# Patient Record
Sex: FEMALE | URBAN - METROPOLITAN AREA
[De-identification: names, ages, dates, MRNs, and addresses within clinical notes are randomized per-mention and may not be internally consistent; named-entity substitution may affect disease eponyms.]

---

## 2017-06-09 ENCOUNTER — DOCUMENTATION ONLY (OUTPATIENT)
Dept: INTERNAL MEDICINE CLINIC | Age: 77
End: 2017-06-09

## 2017-06-09 NOTE — LETTER
6/15/2017 11:48 AM 
 
Ms. Fer Cowart [address] [facility] [address] [id number] Dear Gareth Meléndez: 
 
I hope this letter finds you well. I am a Licensed Practical Nurse with [facility] and I have attempted to contact you by phone, but was unsuccessful. Your good health is important to us. As always, our goal is to be your partner in life-long wellness. Please contact our office at your earliest convenience. If you have any questions, please do not hesitate to give us a call at the number listed above. Sincerely, Diana Lux LPN

## 2017-06-09 NOTE — PROGRESS NOTES
Spoke with pt's hospice RN Susan Garcia. Due to lack of weight loss based on arm measurement and normal albumin, she is being discharged from hospice with last date of service expected to be Monday. Her family had been planning on placement for respite later this month due to travel plans. Please see if there are SNFs in the area that may have beds available for this and what paperwork is needed from our end to facilitate. Travel plans are June 22 through July 8. Pt has h/o stroke, is nonambulatory, incontinent of bowel and bladder.

## 2017-06-12 NOTE — PROGRESS NOTES
Please let pt family know that all they need to do is look around at nursing homes or assisted living facilities in the area once they choose one if the facility requires any info from us have them let us know.

## 2017-06-12 NOTE — PROGRESS NOTES
Attempted to reach patient regarding respite care. No answer; left message for pt to return call to the office at [phone].  Will continue to try to contact patient

## 2017-06-13 NOTE — PROGRESS NOTES
Attempted to contact pt at  number, no answer. Lvm for pt to return call to office at [phone]. Will continue to try to contact pt.

## 2017-06-14 DIAGNOSIS — R53.81 DEBILITY: ICD-10-CM

## 2017-06-14 DIAGNOSIS — I69.30 H/O: STROKE WITH RESIDUAL EFFECTS: Primary | ICD-10-CM

## 2017-06-14 NOTE — PROGRESS NOTES
Attempted to contact pt at  number, no answer. Lvm for pt to return call to office at [phone]. Will continue to try to contact pt.

## 2017-06-15 ENCOUNTER — TELEPHONE (OUTPATIENT)
Dept: INTERNAL MEDICINE CLINIC | Age: 77
End: 2017-06-15

## 2017-06-15 NOTE — PROGRESS NOTES
Spoke with pt's daughter. Pt is being discharged from hospice due to lack of weight loss. Orders placed for hospital bed, wheelchair, home health.

## 2017-06-15 NOTE — PROGRESS NOTES
Attempted to contact pt at  number, no answer. Lvm for pt to return call to office at [phone]. I have been unable to reach this patient by phone. A letter is being sent to the last known home address. Encounter will be closed.

## 2017-06-15 NOTE — TELEPHONE ENCOUNTER
Incoming call from [place] with [employer]. 2 pt identifiers confirmed. States they received referral for pt however, they will not be able to take pt because they are not accepting any new pt. States pt has never been a home health pt of theirs, perhaps pt was with Hospice and [address] [place] [address]. Dr Nakul Taylor please be advised.

## 2017-06-16 ENCOUNTER — HOME HEALTH ADMISSION (OUTPATIENT)
Dept: HOME HEALTH SERVICES | Facility: HOME HEALTH | Age: 77
End: 2017-06-16
Payer: MEDICARE

## 2017-06-16 ENCOUNTER — DOCUMENTATION ONLY (OUTPATIENT)
Dept: INTERNAL MEDICINE CLINIC | Age: 77
End: 2017-06-16

## 2017-06-16 NOTE — TELEPHONE ENCOUNTER
I have faxed over the order for the hospital bed to [facility] with insurance card, demographic and order. Any question on the papers I faxed they are on my keyboard upstairs.

## 2017-06-16 NOTE — TELEPHONE ENCOUNTER
Spoke with Sylvain Quezada she stated that no one at this time had called them to let them know about the referral.  She stated that she will be working on it and that they will try to get out to see patient tomorrow 6/17/17 or Monday at the latest.  Advised that if [facility] took her bed then can we fax over an order to them to get a bed out to patient she stated yes.

## 2017-06-16 NOTE — TELEPHONE ENCOUNTER
These were ordered two days ago but please notify her that her organization has informed me they are not taking new patients. LPN is working on arranging home health with [employer].

## 2017-06-16 NOTE — TELEPHONE ENCOUNTER
She was on hospice, discharged today. Please request [facility] to see patient.

## 2017-06-16 NOTE — TELEPHONE ENCOUNTER
Incoming call from pt  Melanie with [facility] [address] [place] [address]. Zaria Oropeza states that pt is being discharged today 06/16/2017. She states that MD will need to put in a new order for hospital bed and any other supplies pt may need. States to please contact her when supplies have been ordered so that they can  their hospital bed once a delivery date is set. Please be advised.

## 2017-06-16 NOTE — TELEPHONE ENCOUNTER
Spoke with Bela marley with two identifier at [facility] she stated that we need to order the hospital bed and send it to [facility] for delivery. Advised I will get that going. She stated that they are keeping and eye on this chart and once the face to face is done they will finish processing the paperwork to go out and evaluate the patient. I will be faxing over the order to [facility] today for the hospital bed. Provider is aware of this.

## 2017-06-16 NOTE — TELEPHONE ENCOUNTER
[facility] stated they could not begin home care until patient sees PCP.  Home care policy states patient must see pcp within 90 days of assumption of home care.

## 2017-06-16 NOTE — TELEPHONE ENCOUNTER
Called and spoke with Parish Nguyen verified with two Identifiers asked if they had picked up patient's hospice bed that we where told she was discharged from care on Monday. Parish Nguyen stated at this time patient was still active but she needed to call her  and find out and she will call me back.

## 2017-06-19 ENCOUNTER — TELEPHONE (OUTPATIENT)
Dept: INTERNAL MEDICINE CLINIC | Age: 77
End: 2017-06-19

## 2017-06-19 ENCOUNTER — HOME CARE VISIT (OUTPATIENT)
Dept: SCHEDULING | Facility: HOME HEALTH | Age: 77
End: 2017-06-19
Payer: MEDICARE

## 2017-06-19 VITALS
OXYGEN SATURATION: 96 % | HEART RATE: 86 BPM | TEMPERATURE: 98.3 F | DIASTOLIC BLOOD PRESSURE: 90 MMHG | RESPIRATION RATE: 16 BRPM | SYSTOLIC BLOOD PRESSURE: 152 MMHG

## 2017-06-19 PROCEDURE — 400013 HH SOC

## 2017-06-19 PROCEDURE — G0299 HHS/HOSPICE OF RN EA 15 MIN: HCPCS

## 2017-06-20 ENCOUNTER — HOME CARE VISIT (OUTPATIENT)
Dept: HOME HEALTH SERVICES | Facility: HOME HEALTH | Age: 77
End: 2017-06-20
Payer: MEDICARE

## 2017-06-22 ENCOUNTER — HOME CARE VISIT (OUTPATIENT)
Dept: SCHEDULING | Facility: HOME HEALTH | Age: 77
End: 2017-06-22
Payer: MEDICARE

## 2017-06-22 ENCOUNTER — TELEPHONE (OUTPATIENT)
Dept: INTERNAL MEDICINE CLINIC | Age: 77
End: 2017-06-22

## 2017-06-22 VITALS
HEART RATE: 72 BPM | TEMPERATURE: 97.5 F | OXYGEN SATURATION: 97 % | RESPIRATION RATE: 18 BRPM | DIASTOLIC BLOOD PRESSURE: 100 MMHG | SYSTOLIC BLOOD PRESSURE: 160 MMHG

## 2017-06-22 PROCEDURE — G0496 LPN CARE TRAIN/EDU IN HH: HCPCS

## 2017-06-22 NOTE — TELEPHONE ENCOUNTER
[facility] just called to let you they received the order the hospital bed but betty to the patient's Ins they are unable to process it. They have forwarded the order to [facility].

## 2017-06-23 NOTE — TELEPHONE ENCOUNTER
[address] Marty Pratik spoke with Haley. Two patient Identifiers confirmed. Advised I was following up on referral. Haley advised that it was processing but there was additional paperwork needed and she would be faxing over order and request to [phone]. Will await paperwork for signature.

## 2017-06-27 ENCOUNTER — HOME CARE VISIT (OUTPATIENT)
Dept: SCHEDULING | Facility: HOME HEALTH | Age: 77
End: 2017-06-27
Payer: MEDICARE

## 2017-06-27 VITALS
HEART RATE: 80 BPM | SYSTOLIC BLOOD PRESSURE: 138 MMHG | OXYGEN SATURATION: 98 % | DIASTOLIC BLOOD PRESSURE: 70 MMHG | RESPIRATION RATE: 16 BRPM | TEMPERATURE: 97.5 F

## 2017-06-27 PROCEDURE — G0496 LPN CARE TRAIN/EDU IN HH: HCPCS

## 2017-06-29 ENCOUNTER — HOME CARE VISIT (OUTPATIENT)
Dept: SCHEDULING | Facility: HOME HEALTH | Age: 77
End: 2017-06-29
Payer: MEDICARE

## 2017-06-29 VITALS
TEMPERATURE: 98 F | HEART RATE: 76 BPM | DIASTOLIC BLOOD PRESSURE: 70 MMHG | OXYGEN SATURATION: 96 % | RESPIRATION RATE: 16 BRPM | SYSTOLIC BLOOD PRESSURE: 180 MMHG

## 2017-06-29 PROCEDURE — G0496 LPN CARE TRAIN/EDU IN HH: HCPCS

## 2017-07-05 ENCOUNTER — HOME CARE VISIT (OUTPATIENT)
Dept: HOME HEALTH SERVICES | Facility: HOME HEALTH | Age: 77
End: 2017-07-05
Payer: MEDICARE

## 2017-07-05 ENCOUNTER — HOME CARE VISIT (OUTPATIENT)
Dept: SCHEDULING | Facility: HOME HEALTH | Age: 77
End: 2017-07-05
Payer: MEDICARE

## 2017-07-05 VITALS
DIASTOLIC BLOOD PRESSURE: 90 MMHG | TEMPERATURE: 97.1 F | OXYGEN SATURATION: 98 % | SYSTOLIC BLOOD PRESSURE: 184 MMHG | HEART RATE: 83 BPM | RESPIRATION RATE: 16 BRPM

## 2017-07-05 PROCEDURE — G0299 HHS/HOSPICE OF RN EA 15 MIN: HCPCS

## 2017-07-07 ENCOUNTER — HOME CARE VISIT (OUTPATIENT)
Dept: HOME HEALTH SERVICES | Facility: HOME HEALTH | Age: 77
End: 2017-07-07
Payer: MEDICARE

## 2017-07-12 ENCOUNTER — HOME CARE VISIT (OUTPATIENT)
Dept: HOME HEALTH SERVICES | Facility: HOME HEALTH | Age: 77
End: 2017-07-12
Payer: MEDICARE

## 2017-07-12 ENCOUNTER — HOME CARE VISIT (OUTPATIENT)
Dept: SCHEDULING | Facility: HOME HEALTH | Age: 77
End: 2017-07-12
Payer: MEDICARE

## 2017-07-12 VITALS
HEART RATE: 68 BPM | RESPIRATION RATE: 16 BRPM | OXYGEN SATURATION: 97 % | DIASTOLIC BLOOD PRESSURE: 70 MMHG | TEMPERATURE: 97.5 F | SYSTOLIC BLOOD PRESSURE: 146 MMHG

## 2017-07-12 PROCEDURE — G0496 LPN CARE TRAIN/EDU IN HH: HCPCS

## 2017-07-15 ENCOUNTER — HOME CARE VISIT (OUTPATIENT)
Dept: SCHEDULING | Facility: HOME HEALTH | Age: 77
End: 2017-07-15
Payer: MEDICARE

## 2017-07-15 PROCEDURE — G0299 HHS/HOSPICE OF RN EA 15 MIN: HCPCS

## 2017-07-16 VITALS
TEMPERATURE: 98 F | OXYGEN SATURATION: 97 % | SYSTOLIC BLOOD PRESSURE: 160 MMHG | RESPIRATION RATE: 16 BRPM | HEART RATE: 76 BPM | DIASTOLIC BLOOD PRESSURE: 90 MMHG

## 2017-07-28 DIAGNOSIS — Z86.73 H/O: STROKE: Primary | ICD-10-CM

## 2017-08-04 ENCOUNTER — DOCUMENTATION ONLY (OUTPATIENT)
Dept: INTERNAL MEDICINE CLINIC | Age: 77
End: 2017-08-04

## 2017-08-04 NOTE — PROGRESS NOTES
Pt has order for hospital bed placed last month but has not been received. Can we check on status of this and stephany lift?

## 2017-11-06 RX ORDER — RANITIDINE 150 MG/1
TABLET, FILM COATED ORAL
Qty: 180 TAB | Refills: 0 | Status: SHIPPED | OUTPATIENT
Start: 2017-11-06 | End: 2018-02-01 | Stop reason: SDUPTHER

## 2018-02-01 RX ORDER — RANITIDINE 150 MG/1
TABLET, FILM COATED ORAL
Qty: 180 TAB | Refills: 0 | Status: SHIPPED | OUTPATIENT
Start: 2018-02-01 | End: 2018-04-24 | Stop reason: SDUPTHER

## 2018-04-25 RX ORDER — RANITIDINE 150 MG/1
TABLET, FILM COATED ORAL
Qty: 180 TAB | Refills: 0 | Status: SHIPPED | OUTPATIENT
Start: 2018-04-25 | End: 2018-07-26 | Stop reason: SDUPTHER

## 2018-07-26 RX ORDER — RANITIDINE 150 MG/1
TABLET, FILM COATED ORAL
Qty: 180 TAB | Refills: 0 | Status: SHIPPED | OUTPATIENT
Start: 2018-07-26